# Patient Record
Sex: FEMALE | Race: WHITE
[De-identification: names, ages, dates, MRNs, and addresses within clinical notes are randomized per-mention and may not be internally consistent; named-entity substitution may affect disease eponyms.]

---

## 2019-11-11 ENCOUNTER — HOSPITAL ENCOUNTER (OUTPATIENT)
Dept: HOSPITAL 7 - FB.SDS | Age: 29
Discharge: HOME | End: 2019-11-11
Payer: COMMERCIAL

## 2019-11-11 VITALS — SYSTOLIC BLOOD PRESSURE: 134 MMHG | DIASTOLIC BLOOD PRESSURE: 84 MMHG | HEART RATE: 62 BPM

## 2019-11-11 DIAGNOSIS — K82.8: Primary | ICD-10-CM

## 2019-11-11 NOTE — PCM.PN
- General Info


Date of Service: 11/11/19





- Review of Systems


Systems Review Comment:: 





28 y/o female here for cholecystectomy because of symptomatic gallbladder 

disease.  She is medically stable to proceed.  The proposed procedure has again 

been discussed with the patient.  Expectations and instructions reviewed.  

Questions answered and she agrees to proceed.





- Patient Data


Vitals - Most Recent: 


 Last Vital Signs











Temp  98.7 F   11/11/19 07:12


 


Pulse  82   11/11/19 07:12


 


Resp  15   11/11/19 07:12


 


BP  138/94 H  11/11/19 07:12


 


Pulse Ox  99   11/11/19 07:12











Weight - Most Recent: 240 lb


Lab Results Last 24 Hours: 


 Laboratory Results - last 24 hr











  11/11/19 Range/Units





  07:06 


 


Urine HCG, Qual  Negative  (NEGATIVE)  











Med Orders - Current: 


 Current Medications





Lactated Ringer's (Ringers, Lactated)  1,000 mls @ 125 mls/hr IV ASDIRECTED JOSE


   Last Admin: 11/11/19 07:29 Dose:  125 mls/hr


Sodium Chloride (Saline Flush)  10 ml FLUSH ASDIRECTED PRN


   PRN Reason: Keep Vein Open





Discontinued Medications





Acetaminophen (Tylenol Extra Strength)  1,000 mg PO ONETIME ONE


   Stop: 11/11/19 07:05


   Last Admin: 11/11/19 07:39 Dose:  1,000 mg


Bupivacaine HCl (Marcaine 0.5%)  10 ml INJECT .STK-MED ONE


   Stop: 11/11/19 08:10


   Last Admin: 11/11/19 08:09 Dose:  10 ml


Gabapentin (Neurontin)  300 mg PO ONETIME ONE


   Stop: 11/11/19 07:05


   Last Admin: 11/11/19 07:39 Dose:  300 mg


Cefazolin Sodium/Dextrose 2 gm (/ Premix)  50 mls @ 100 mls/hr IV ONETIME ONE


   Stop: 11/11/19 07:59


   Last Admin: 11/11/19 08:14 Dose:  100 mls/hr











- Problem List Review


Problem List Initiated/Reviewed/Updated: Yes





- My Orders


Last 24 Hours: 


My Active Orders





11/10/19 Dinner


Nothing Per Oral Diet [DIET] 





11/11/19 06:45


Patient Status [ADT] Routine 


Patient to Empty Bladder [RC] ASDIRECTED 


RT Incentive Spirometry [RC] ASDIRECTED 


Verify Patient Consent Obtain [RC] ASDIRECTED 


Lactated Ringers [Ringers, Lactated] 1,000 ml IV ASDIRECTED 


Sodium Chloride 0.9% [Saline Flush]   10 ml FLUSH ASDIRECTED PRN 


Peripheral IV Insertion Adult [OM.PC] Routine 


Sequential Compression Device [OM.PC] Routine 














- Assessment


Assessment:: 





Symptomatic gallbladder disease





- Plan


Plan:: 





cholecystectomy

## 2019-11-11 NOTE — PCM.OPNOTE
- General Post-Op/Procedure Note


Date of Surgery/Procedure: 11/11/19


Operative Procedure(s): Cholecystectomy


Findings: 





Multiple adhesions to gallbladder


Structures otherwise appear normal


Pre Op Diagnosis: Biliary Dyskinesia


Post-Op Diagnosis: Same


Anesthesia Technique: General ET Tube


Primary Surgeon: Robles Crowley


Pathology: 





Gallbladder


Output, Urine Amount: 0


EBL in mLs: 20


Complications: None


Condition: Good

## 2019-11-11 NOTE — OR
DATE OF OPERATION:  11/11/2019

 

SURGEON:  Robles Crowley MD

 

PREOPERATIVE DIAGNOSIS:  Biliary dyskinesia.

 

POSTOPERATIVE DIAGNOSIS:  Biliary dyskinesia.

 

OPERATION PERFORMED:  Laparoscopic cholecystectomy.

 

INDICATIONS FOR SURGERY:  This 29-year-old female has developed symptoms of

postprandial right upper quadrant abdominal pain.  She is noted to have a low

gallbladder ejection fraction, which was felt to be responsible for her symptoms

and she comes for cholecystectomy.

 

FINDINGS:  The gallbladder has multiple fatty tissue adhesions to its

undersurface.  Yet, and the adjacent liver otherwise appear normal.  No other

intraabdominal abnormalities were identified.

 

PROCEDURE IN DETAIL:  The patient was taken to the operating room.  She was

given general endotracheal anesthesia, and the abdomen was sterilely prepped and

draped.  A supraumbilical stab wound incision was made.  Through this, a Veress

needle was inserted and pneumoperitoneum via this needle to a pressure of 15

mmHg was achieved with carbon dioxide.  The Veress needle was then replaced with

a 12 mm trocar into which the 5 mm variable angled laparoscopic camera was

inserted.  Under direct visualization, 5 mm trocars were placed in the

subxiphoid midline and in 2 areas of the right abdomen.  All trocar sites were

infiltrated with Marcaine prior to incision.  Intra-abdominal inspection was

carried out and attention was turned to the gallbladder.  It was secured with

grasping forceps placed through the lateral trocars and retracted superiorly and

anteriorly.  Fatty tissue adhesions to the undersurface of the gallbladder are

carefully taken down until the lower portion of the gallbladder is exposed.

Additional dissection then identified the cystic duct and cystic artery.  The

cystic artery was clearly identified and exposed and then doubly clipped and

divided.  The triangle of Calot was completely cleared, and the cystic duct

clearly and positively identified including its junction with the gallbladder.

The cystic duct was double doubly clipped and divided near the gallbladder after

it was milked, and the cystic duct was divided near the gallbladder with great

care being used to avoid any injury or compromise to the common bile duct.  The

gallbladder was then dissected free from the undersurface of the liver using the

hook cautery device.  Once the gallbladder been completely freed, it was

extracted without difficulty through the umbilical trocar site.  Reinspection of

the gallbladder bed was carried out, and the area was copiously irrigated.  With

no sign of bleeding or any other complication, the trocars were removed under

direct visualization and the pneumoperitoneum was evacuated.  The fascia of the

umbilical trocar site was closed with a figure-of-eight 0 Vicryl suture.  Wounds

were irrigated with Betadine and saline solution.  Skin incisions were

approximated with interrupted 4-0 Vicryl in a subcuticular stitch.  Steri-Strips

and benzoin were applied, followed by antibiotic ointment and sterile dressings.

The patient was awakened, extubated, and taken from the operating room in

satisfactory condition.

 

ESTIMATED BLOOD LOSS:  20 mL.

 

COMPLICATIONS:  None.

 

PROGNOSIS:  Good.

 

Job#: [id number]

DD: 11/11/2019 [id number]

DT: 11/11/2019 1111 REMA/VY